# Patient Record
(demographics unavailable — no encounter records)

---

## 2025-06-04 NOTE — HISTORY OF PRESENT ILLNESS
[FreeTextEntry6] : 16 year old male inquiring about Plan B.   Patient clarified that he is interested in picking up Plan B for his girlfriend.  Last sexually active one week ago,  Patient uses condoms 10% of the time.  Interested in STI testing.

## 2025-06-04 NOTE — DISCUSSION/SUMMARY
[FreeTextEntry1] : 16 year old male presenting for sexual education   - Reviewed that partner should come in for reproductive health  - Congratulated patient on advocating for partner's health  -  Reviewed emergency contraception is typically given 5 days after unprotected sex  -  Routine STI testing today; urine GC/CT will call with results